# Patient Record
Sex: MALE | Employment: OTHER | URBAN - METROPOLITAN AREA
[De-identification: names, ages, dates, MRNs, and addresses within clinical notes are randomized per-mention and may not be internally consistent; named-entity substitution may affect disease eponyms.]

---

## 2018-01-26 ENCOUNTER — OFFICE VISIT (OUTPATIENT)
Dept: FAMILY MEDICINE CLINIC | Facility: CLINIC | Age: 38
End: 2018-01-26

## 2018-01-26 VITALS
HEIGHT: 69 IN | RESPIRATION RATE: 18 BRPM | HEART RATE: 98 BPM | DIASTOLIC BLOOD PRESSURE: 68 MMHG | SYSTOLIC BLOOD PRESSURE: 118 MMHG | OXYGEN SATURATION: 96 % | WEIGHT: 195.81 LBS | TEMPERATURE: 100 F | BODY MASS INDEX: 29 KG/M2

## 2018-01-26 DIAGNOSIS — J11.1 INFLUENZA-LIKE ILLNESS: Primary | ICD-10-CM

## 2018-01-26 PROCEDURE — 99202 OFFICE O/P NEW SF 15 MIN: CPT | Performed by: NURSE PRACTITIONER

## 2018-01-26 RX ORDER — OSELTAMIVIR PHOSPHATE 75 MG/1
75 CAPSULE ORAL 2 TIMES DAILY
Qty: 10 CAPSULE | Refills: 0 | Status: SHIPPED | OUTPATIENT
Start: 2018-01-26 | End: 2018-01-31

## 2018-01-26 RX ORDER — CODEINE PHOSPHATE AND GUAIFENESIN 10; 100 MG/5ML; MG/5ML
5 SOLUTION ORAL NIGHTLY PRN
Qty: 118 ML | Refills: 0 | Status: SHIPPED | OUTPATIENT
Start: 2018-01-26 | End: 2018-02-02

## 2018-01-26 NOTE — PROGRESS NOTES
Patient presents with:  Cough: cough, fever 102-axillary 30 min before visit, chills, bodya aches, headache, sinus congestion, ear congestion x2 days (Theraflu)  :    HPI:   Gus Omer is a 30year old male who presents for upper respiratory EARS: TM's clear gray, no bulging, no retraction, no fluid, bony landmarks visible  NOSE: nostrils patent, clear nasal mucous, nasal mucosa reddened and swollen  THROAT: oral mucosa pink, moist. No visible dental caries.  Posterior pharynx is not erythemato Influenza is also called the flu. It is a viral illness that affects the air passages of your lungs. It is different from the common cold. The flu can easily be passed from one to person to another.  It may be spread through the air by coughing and sneezing If you are age 72 or older, talk with your provider about getting a pneumococcal vaccine every 5 years. You should also get this vaccine if you have chronic asthma or COPD. All adults should get a flu vaccine every fall. Ask your provider about this.   When

## 2018-01-26 NOTE — PATIENT INSTRUCTIONS
Humidifier in room  Sleep propped  Push fluids  Limit dairy  Mucinex as directed  Sudafed as needed    Influenza (Adult)    Influenza is also called the flu. It is a viral illness that affects the air passages of your lungs.  It is different from the comm Follow up with your healthcare provider, or as advised, if you are not getting better over the next week. If you are age 72 or older, talk with your provider about getting a pneumococcal vaccine every 5 years.  You should also get this vaccine if you have